# Patient Record
Sex: MALE | Race: WHITE | HISPANIC OR LATINO | Employment: FULL TIME | URBAN - METROPOLITAN AREA
[De-identification: names, ages, dates, MRNs, and addresses within clinical notes are randomized per-mention and may not be internally consistent; named-entity substitution may affect disease eponyms.]

---

## 2018-11-11 ENCOUNTER — APPOINTMENT (OUTPATIENT)
Dept: RADIOLOGY | Facility: MEDICAL CENTER | Age: 33
DRG: 638 | End: 2018-11-11
Attending: EMERGENCY MEDICINE
Payer: COMMERCIAL

## 2018-11-11 ENCOUNTER — HOSPITAL ENCOUNTER (INPATIENT)
Facility: MEDICAL CENTER | Age: 33
LOS: 1 days | DRG: 638 | End: 2018-11-12
Attending: EMERGENCY MEDICINE | Admitting: INTERNAL MEDICINE
Payer: COMMERCIAL

## 2018-11-11 DIAGNOSIS — R73.9 HYPERGLYCEMIA: ICD-10-CM

## 2018-11-11 DIAGNOSIS — M54.9 PAIN, UPPER BACK: ICD-10-CM

## 2018-11-11 DIAGNOSIS — K62.89 RECTAL PAIN: ICD-10-CM

## 2018-11-11 PROBLEM — E11.65 DIABETES MELLITUS WITH HYPERGLYCEMIA (HCC): Status: ACTIVE | Noted: 2018-11-11

## 2018-11-11 PROBLEM — E86.0 DEHYDRATION: Status: ACTIVE | Noted: 2018-11-11

## 2018-11-11 PROBLEM — K64.9 HEMORRHOID: Status: ACTIVE | Noted: 2018-11-11

## 2018-11-11 PROBLEM — F17.200 SMOKING: Status: ACTIVE | Noted: 2018-11-11

## 2018-11-11 PROBLEM — M54.50 LOW BACK PAIN: Status: ACTIVE | Noted: 2018-11-11

## 2018-11-11 PROBLEM — E87.1 HYPONATREMIA: Status: ACTIVE | Noted: 2018-11-11

## 2018-11-11 PROBLEM — E66.9 OBESITY: Status: ACTIVE | Noted: 2018-11-11

## 2018-11-11 LAB
ACETONE UR QL: NEGATIVE
ANION GAP SERPL CALC-SCNC: 8 MMOL/L (ref 0–11.9)
APPEARANCE UR: CLEAR
BASOPHILS # BLD AUTO: 0.6 % (ref 0–1.8)
BASOPHILS # BLD: 0.04 K/UL (ref 0–0.12)
BILIRUB UR QL STRIP.AUTO: NEGATIVE
BUN SERPL-MCNC: 7 MG/DL (ref 8–22)
CALCIUM SERPL-MCNC: 9.3 MG/DL (ref 8.5–10.5)
CHLORIDE SERPL-SCNC: 96 MMOL/L (ref 96–112)
CO2 SERPL-SCNC: 26 MMOL/L (ref 20–33)
COLOR UR: YELLOW
CREAT SERPL-MCNC: 0.81 MG/DL (ref 0.5–1.4)
EOSINOPHIL # BLD AUTO: 0.09 K/UL (ref 0–0.51)
EOSINOPHIL NFR BLD: 1.2 % (ref 0–6.9)
ERYTHROCYTE [DISTWIDTH] IN BLOOD BY AUTOMATED COUNT: 41.5 FL (ref 35.9–50)
EST. AVERAGE GLUCOSE BLD GHB EST-MCNC: 398 MG/DL
GLUCOSE BLD-MCNC: 256 MG/DL (ref 65–99)
GLUCOSE BLD-MCNC: 285 MG/DL (ref 65–99)
GLUCOSE BLD-MCNC: 319 MG/DL (ref 65–99)
GLUCOSE BLD-MCNC: 329 MG/DL (ref 65–99)
GLUCOSE SERPL-MCNC: 653 MG/DL (ref 65–99)
GLUCOSE UR STRIP.AUTO-MCNC: >=1000 MG/DL
HBA1C MFR BLD: 15.5 % (ref 0–5.6)
HCT VFR BLD AUTO: 41.5 % (ref 42–52)
HGB BLD-MCNC: 14.3 G/DL (ref 14–18)
IMM GRANULOCYTES # BLD AUTO: 0.03 K/UL (ref 0–0.11)
IMM GRANULOCYTES NFR BLD AUTO: 0.4 % (ref 0–0.9)
KETONES UR STRIP.AUTO-MCNC: NEGATIVE MG/DL
LEUKOCYTE ESTERASE UR QL STRIP.AUTO: NEGATIVE
LYMPHOCYTES # BLD AUTO: 0.87 K/UL (ref 1–4.8)
LYMPHOCYTES NFR BLD: 12 % (ref 22–41)
MAGNESIUM SERPL-MCNC: 1.9 MG/DL (ref 1.5–2.5)
MCH RBC QN AUTO: 30.8 PG (ref 27–33)
MCHC RBC AUTO-ENTMCNC: 34.5 G/DL (ref 33.7–35.3)
MCV RBC AUTO: 89.2 FL (ref 81.4–97.8)
MICRO URNS: ABNORMAL
MONOCYTES # BLD AUTO: 0.34 K/UL (ref 0–0.85)
MONOCYTES NFR BLD AUTO: 4.7 % (ref 0–13.4)
NEUTROPHILS # BLD AUTO: 5.89 K/UL (ref 1.82–7.42)
NEUTROPHILS NFR BLD: 81.1 % (ref 44–72)
NITRITE UR QL STRIP.AUTO: NEGATIVE
NRBC # BLD AUTO: 0 K/UL
NRBC BLD-RTO: 0 /100 WBC
PH UR STRIP.AUTO: 6 [PH]
PLATELET # BLD AUTO: 256 K/UL (ref 164–446)
PMV BLD AUTO: 10.6 FL (ref 9–12.9)
POTASSIUM SERPL-SCNC: 4.4 MMOL/L (ref 3.6–5.5)
PROT UR QL STRIP: NEGATIVE MG/DL
RBC # BLD AUTO: 4.65 M/UL (ref 4.7–6.1)
RBC UR QL AUTO: NEGATIVE
SODIUM SERPL-SCNC: 130 MMOL/L (ref 135–145)
SP GR UR STRIP.AUTO: 1.03
TSH SERPL DL<=0.005 MIU/L-ACNC: 3.33 UIU/ML (ref 0.38–5.33)
UROBILINOGEN UR STRIP.AUTO-MCNC: 0.2 MG/DL
WBC # BLD AUTO: 7.3 K/UL (ref 4.8–10.8)

## 2018-11-11 PROCEDURE — 84443 ASSAY THYROID STIM HORMONE: CPT

## 2018-11-11 PROCEDURE — 96374 THER/PROPH/DIAG INJ IV PUSH: CPT

## 2018-11-11 PROCEDURE — 83036 HEMOGLOBIN GLYCOSYLATED A1C: CPT

## 2018-11-11 PROCEDURE — 82962 GLUCOSE BLOOD TEST: CPT

## 2018-11-11 PROCEDURE — 99285 EMERGENCY DEPT VISIT HI MDM: CPT

## 2018-11-11 PROCEDURE — 72193 CT PELVIS W/DYE: CPT

## 2018-11-11 PROCEDURE — 96375 TX/PRO/DX INJ NEW DRUG ADDON: CPT

## 2018-11-11 PROCEDURE — 700111 HCHG RX REV CODE 636 W/ 250 OVERRIDE (IP): Performed by: EMERGENCY MEDICINE

## 2018-11-11 PROCEDURE — 700117 HCHG RX CONTRAST REV CODE 255: Performed by: EMERGENCY MEDICINE

## 2018-11-11 PROCEDURE — 83735 ASSAY OF MAGNESIUM: CPT

## 2018-11-11 PROCEDURE — 700102 HCHG RX REV CODE 250 W/ 637 OVERRIDE(OP): Performed by: EMERGENCY MEDICINE

## 2018-11-11 PROCEDURE — 770006 HCHG ROOM/CARE - MED/SURG/GYN SEMI*

## 2018-11-11 PROCEDURE — 80048 BASIC METABOLIC PNL TOTAL CA: CPT

## 2018-11-11 PROCEDURE — 99223 1ST HOSP IP/OBS HIGH 75: CPT | Performed by: INTERNAL MEDICINE

## 2018-11-11 PROCEDURE — 81003 URINALYSIS AUTO W/O SCOPE: CPT

## 2018-11-11 PROCEDURE — 81002 URINALYSIS NONAUTO W/O SCOPE: CPT

## 2018-11-11 PROCEDURE — 96376 TX/PRO/DX INJ SAME DRUG ADON: CPT

## 2018-11-11 PROCEDURE — 85025 COMPLETE CBC W/AUTO DIFF WBC: CPT

## 2018-11-11 PROCEDURE — 700105 HCHG RX REV CODE 258: Performed by: INTERNAL MEDICINE

## 2018-11-11 PROCEDURE — 700105 HCHG RX REV CODE 258: Performed by: EMERGENCY MEDICINE

## 2018-11-11 PROCEDURE — A9270 NON-COVERED ITEM OR SERVICE: HCPCS | Performed by: INTERNAL MEDICINE

## 2018-11-11 PROCEDURE — 72131 CT LUMBAR SPINE W/O DYE: CPT

## 2018-11-11 PROCEDURE — 700102 HCHG RX REV CODE 250 W/ 637 OVERRIDE(OP): Performed by: INTERNAL MEDICINE

## 2018-11-11 RX ORDER — POLYETHYLENE GLYCOL 3350 17 G/17G
1 POWDER, FOR SOLUTION ORAL
Status: DISCONTINUED | OUTPATIENT
Start: 2018-11-11 | End: 2018-11-12 | Stop reason: HOSPADM

## 2018-11-11 RX ORDER — BISACODYL 10 MG
10 SUPPOSITORY, RECTAL RECTAL
Status: DISCONTINUED | OUTPATIENT
Start: 2018-11-11 | End: 2018-11-12 | Stop reason: HOSPADM

## 2018-11-11 RX ORDER — ONDANSETRON 2 MG/ML
4 INJECTION INTRAMUSCULAR; INTRAVENOUS ONCE
Status: COMPLETED | OUTPATIENT
Start: 2018-11-11 | End: 2018-11-11

## 2018-11-11 RX ORDER — BENZOCAINE/MENTHOL 6 MG-10 MG
LOZENGE MUCOUS MEMBRANE 2 TIMES DAILY
Status: DISCONTINUED | OUTPATIENT
Start: 2018-11-11 | End: 2018-11-12 | Stop reason: HOSPADM

## 2018-11-11 RX ORDER — MORPHINE SULFATE 4 MG/ML
4 INJECTION, SOLUTION INTRAMUSCULAR; INTRAVENOUS ONCE
Status: COMPLETED | OUTPATIENT
Start: 2018-11-11 | End: 2018-11-11

## 2018-11-11 RX ORDER — SODIUM CHLORIDE 9 MG/ML
1000 INJECTION, SOLUTION INTRAVENOUS ONCE
Status: COMPLETED | OUTPATIENT
Start: 2018-11-11 | End: 2018-11-11

## 2018-11-11 RX ORDER — AMOXICILLIN 250 MG
2 CAPSULE ORAL 2 TIMES DAILY
Status: DISCONTINUED | OUTPATIENT
Start: 2018-11-11 | End: 2018-11-12 | Stop reason: HOSPADM

## 2018-11-11 RX ORDER — HEPARIN SODIUM 5000 [USP'U]/ML
5000 INJECTION, SOLUTION INTRAVENOUS; SUBCUTANEOUS EVERY 8 HOURS
Status: DISCONTINUED | OUTPATIENT
Start: 2018-11-11 | End: 2018-11-12 | Stop reason: HOSPADM

## 2018-11-11 RX ORDER — DEXTROSE MONOHYDRATE 25 G/50ML
25 INJECTION, SOLUTION INTRAVENOUS
Status: DISCONTINUED | OUTPATIENT
Start: 2018-11-11 | End: 2018-11-12 | Stop reason: HOSPADM

## 2018-11-11 RX ORDER — ACETAMINOPHEN 325 MG/1
650 TABLET ORAL EVERY 6 HOURS PRN
Status: DISCONTINUED | OUTPATIENT
Start: 2018-11-11 | End: 2018-11-12 | Stop reason: HOSPADM

## 2018-11-11 RX ORDER — SODIUM CHLORIDE 9 MG/ML
INJECTION, SOLUTION INTRAVENOUS CONTINUOUS
Status: DISCONTINUED | OUTPATIENT
Start: 2018-11-11 | End: 2018-11-12 | Stop reason: HOSPADM

## 2018-11-11 RX ADMIN — IOHEXOL 100 ML: 350 INJECTION, SOLUTION INTRAVENOUS at 11:26

## 2018-11-11 RX ADMIN — SODIUM CHLORIDE: 9 INJECTION, SOLUTION INTRAVENOUS at 15:16

## 2018-11-11 RX ADMIN — HYDROCORTISONE: 1 CREAM TOPICAL at 17:10

## 2018-11-11 RX ADMIN — ACETAMINOPHEN 650 MG: 325 TABLET, FILM COATED ORAL at 17:09

## 2018-11-11 RX ADMIN — SODIUM CHLORIDE 1000 ML: 9 INJECTION, SOLUTION INTRAVENOUS at 13:22

## 2018-11-11 RX ADMIN — INSULIN HUMAN 4 UNITS: 100 INJECTION, SOLUTION PARENTERAL at 21:02

## 2018-11-11 RX ADMIN — ONDANSETRON 4 MG: 2 INJECTION INTRAMUSCULAR; INTRAVENOUS at 10:02

## 2018-11-11 RX ADMIN — MORPHINE SULFATE 4 MG: 4 INJECTION INTRAVENOUS at 10:02

## 2018-11-11 RX ADMIN — MORPHINE SULFATE 4 MG: 4 INJECTION INTRAVENOUS at 13:17

## 2018-11-11 RX ADMIN — INSULIN HUMAN 3 UNITS: 100 INJECTION, SOLUTION PARENTERAL at 17:15

## 2018-11-11 RX ADMIN — SODIUM CHLORIDE 1000 ML: 9 INJECTION, SOLUTION INTRAVENOUS at 12:05

## 2018-11-11 ASSESSMENT — COGNITIVE AND FUNCTIONAL STATUS - GENERAL
DAILY ACTIVITIY SCORE: 24
SUGGESTED CMS G CODE MODIFIER DAILY ACTIVITY: CH
SUGGESTED CMS G CODE MODIFIER MOBILITY: CH
MOBILITY SCORE: 24

## 2018-11-11 ASSESSMENT — PATIENT HEALTH QUESTIONNAIRE - PHQ9
1. LITTLE INTEREST OR PLEASURE IN DOING THINGS: NOT AT ALL
2. FEELING DOWN, DEPRESSED, IRRITABLE, OR HOPELESS: NOT AT ALL
SUM OF ALL RESPONSES TO PHQ9 QUESTIONS 1 AND 2: 0

## 2018-11-11 ASSESSMENT — ENCOUNTER SYMPTOMS
BLURRED VISION: 0
NECK PAIN: 0
NAUSEA: 0
TINGLING: 0
NERVOUS/ANXIOUS: 1
DIZZINESS: 0
SPUTUM PRODUCTION: 0
PHOTOPHOBIA: 0
COUGH: 0
CHILLS: 0
VOMITING: 0
HEARTBURN: 0
BACK PAIN: 1
POLYDIPSIA: 1
MYALGIAS: 0
ORTHOPNEA: 0
PALPITATIONS: 0
HEMOPTYSIS: 0
DOUBLE VISION: 0
WEIGHT LOSS: 1
HEADACHES: 0
FEVER: 0

## 2018-11-11 ASSESSMENT — PAIN SCALES - GENERAL
PAINLEVEL_OUTOF10: 10
PAINLEVEL_OUTOF10: 8
PAINLEVEL_OUTOF10: 0
PAINLEVEL_OUTOF10: 3
PAINLEVEL_OUTOF10: 10
PAINLEVEL_OUTOF10: 0

## 2018-11-11 ASSESSMENT — LIFESTYLE VARIABLES
DO YOU DRINK ALCOHOL: NO
SUBSTANCE_ABUSE: 0

## 2018-11-11 NOTE — ED TRIAGE NOTES
Ambulatory to triage with   Chief Complaint   Patient presents with   • Back Pain   • Leg Pain   • Rectal Pain   States seen at [facility] and diagnosed with hemorrhoid 2 weeks ago. States symptoms worsening despite medicated ointment. In obvious discomfort. Denies fever/chills.

## 2018-11-11 NOTE — ASSESSMENT & PLAN NOTE
Spent approx 5 mins on Tobacco cessation education . Discussed options of nicotine patch, medical treatment with wellbutrin and chantix. Discussed the benefits of quitting smoking and risks of continued smoking including cardiovascular disease, cancer and COPD.   Code 22907

## 2018-11-11 NOTE — CARE PLAN
Problem: Communication  Goal: The ability to communicate needs accurately and effectively will improve  Outcome: PROGRESSING AS EXPECTED  Pt verbalizes understanding to POC. Awaiting diabetic educator consult to go over new diagnosis of DM. Pt tearful asking if he is going to die. ED MD informed pt that if he left the ED without speaking with educator, he would die. This RN educated pt on long term complications of diabetes leading to eventual death. RN encouraged pt to speak with educator and learn about his disease. Educated pt that DM is treatable and managed with medications, diet and exercise. Pt verbalizes understanding.     Problem: Safety  Goal: Will remain free from injury  Outcome: PROGRESSING AS EXPECTED  Pt is a no risk for falls. Educated to call for assistance if needed.

## 2018-11-11 NOTE — ASSESSMENT & PLAN NOTE
This is pseudohyponatremia related to hyperglycemia  Continue with insulin treatment, monitor sodium

## 2018-11-11 NOTE — H&P
Hospital Medicine History & Physical Note    Date of Service  11/11/2018    Primary Care Physician  Pcp Pt States None    Consultants  None    Code Status  Full code    Chief Complaint  Back pain, thirst    History of Presenting Illness  33 y.o. male with history of obesity who presented 11/11/2018 with complaints of back pain, radiating to his bilateral legs with some bilateral leg numbness.  Up to 8 out of 10, worsening with movement.  He denies urine incontinence or fecal incontinence.  He does complain of some rectal pain that is not new.  It is associated with painful defecation and small amount of blood on toilet paper.  He was evaluated for this recently at [facility] and diagnosed with hemorrhoids and was treated with cream and stool softeners.  Review of system positive for source, poorly urea, weight loss 30 pounds unintentional in the last few months.  Blood work characteristic for a markedly high blood sugar at 600.  Therefore, patient was diagnosed with new onset diabetes today.    Review of Systems  Review of Systems   Constitutional: Positive for malaise/fatigue and weight loss. Negative for chills and fever.   HENT: Negative for ear pain, hearing loss and tinnitus.    Eyes: Negative for blurred vision, double vision and photophobia.   Respiratory: Negative for cough, hemoptysis and sputum production.    Cardiovascular: Negative for chest pain, palpitations and orthopnea.   Gastrointestinal: Negative for heartburn, nausea and vomiting.   Genitourinary: Positive for frequency. Negative for dysuria and urgency.   Musculoskeletal: Positive for back pain. Negative for myalgias and neck pain.   Skin: Negative for itching and rash.   Neurological: Negative for dizziness, tingling and headaches.   Endo/Heme/Allergies: Positive for polydipsia.   Psychiatric/Behavioral: Negative for substance abuse and suicidal ideas. The patient is nervous/anxious.        Past Medical History  Obesity    Surgical History    has no past surgical history on file.     Family History  History of diabetes in mother    Social History   reports that he has been smoking Cigarettes.  He has never used smokeless tobacco. He reports that he drinks alcohol. He reports that he does not use drugs.    Allergies  No Known Allergies    Medications  None       Physical Exam  Temp:  [36.1 °C (96.9 °F)] 36.1 °C (96.9 °F)  Pulse:  [72-88] 79  Resp:  [18-20] 18  BP: (120-133)/(71-74) 120/71    Physical Exam   Constitutional: He is oriented to person, place, and time. He appears well-developed and well-nourished.   HENT:   Dry mucous membranes   Neck: Normal range of motion. Neck supple.   Cardiovascular: Normal rate and regular rhythm.    Pulmonary/Chest: Effort normal and breath sounds normal.   Abdominal: Soft. Bowel sounds are normal.   Musculoskeletal: Normal range of motion.   Tenderness to palpation in the right paraspinal lumbar area     Neurological: He is alert and oriented to person, place, and time.   No focal deficit   Skin: Skin is warm and dry.   Nursing note and vitals reviewed.      Laboratory:  Recent Labs      11/11/18   0945   WBC  7.3   RBC  4.65*   HEMOGLOBIN  14.3   HEMATOCRIT  41.5*   MCV  89.2   MCH  30.8   MCHC  34.5   RDW  41.5   PLATELETCT  256   MPV  10.6     Recent Labs      11/11/18   0945   SODIUM  130*   POTASSIUM  4.4   CHLORIDE  96   CO2  26   GLUCOSE  653*   BUN  7*   CREATININE  0.81   CALCIUM  9.3     Recent Labs      11/11/18   0945   GLUCOSE  653*                 No results for input(s): TROPONINI in the last 72 hours.    Urinalysis:    Recent Labs      11/11/18   0945   SPECGRAVITY  1.033   GLUCOSEUR  >=1000*   KETONES  Negative  Negative   NITRITE  Negative   LEUKESTERAS  Negative        Imaging:  CT-PELVIS WITH   Final Result      No evidence of pelvic or perirectal inflammation or fluid collection.      CT-LSPINE W/O PLUS RECONS   Final Result      1.  No evidence of fracture of the lumbar spine.      2.   Degenerative disc disease at L1-2.            Assessment/Plan:  I anticipate this patient will require at least two midnights for appropriate medical management, necessitating inpatient admission.    Diabetes mellitus with hyperglycemia (HCC)   Assessment & Plan    Newly diagnosed.  With hyperglycemia, without ketoacidosis  Given 10 units of insulin and will be started on sliding scale  A1c pending  Patient will need diabetes education     Smoking   Assessment & Plan    Spent approx 5 mins on Tobacco cessation education . Discussed options of nicotine patch, medical treatment with wellbutrin and chantix. Discussed the benefits of quitting smoking and risks of continued smoking including cardiovascular disease, cancer and COPD.   Code 08255       Dehydration   Assessment & Plan    Secondary to uncontrolled diabetes  We will start IV fluids     Class 2 obesity in adult   Assessment & Plan    Recommended outpatient management of obesity.  Regular exercises     Hyponatremia   Assessment & Plan    This is pseudohyponatremia related to hyperglycemia  Continue with insulin treatment, monitor sodium     Hemorrhoid   Assessment & Plan    CT of the pelvis did not show evidence of perirectal abscess  Anti-hemorrhoid cream     Low back pain   Assessment & Plan    With associated sciatica  CT of the lumbar spine showed no evidence of fracture of the lumbar spine.  Degenerative changes in L1-L2  Pain control, physical therapy         VTE prophylaxis: Heparin

## 2018-11-11 NOTE — ED PROVIDER NOTES
ED Provider Note    Scribed for Isaiah Mercado M.D. by Goldie Calix. 11/11/2018  8:44 AM    Primary Care Provider: Pcp Pt States None  Means of arrival: walk-in  History limited by: none    CHIEF COMPLAINT  Chief Complaint   Patient presents with   • Back Pain   • Leg Pain   • Rectal Pain       HPI  Moshe Soto is a 33 y.o. male who presents to the ED complaining of rectal pain that has been present for the last 3 weeks, as well as lower back pain with pain/numbness radiation into his bilateral legs that began earlier this morning.   Patient was evaluated at [facility] for the same rectal pain at initial onset, diagnosed with a hemorrhoid, discharged home with a cream to treat it. He states that the discomfort has improved with the cream, however, he has had to continuously apply it and it has never fully resolved. Since the evaluation at [facility], patient has been taking stool softeners. He states this is working, with regular soft bowel movement, however, notices approximately a teaspoon worth of bright red blood along the outside of his formed stools every time. No gross hematochezia or melena noted.   Today the patient became concerned when he woke up with newly onset lower back pain that radiates down the back of bilateral legs with associated bilateral leg numbness as well. Patient states that he has experienced this back discomfort in the past, however, never with the leg involvement or numbness. The back pain experienced today is exacerbated with any sort of movement. 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    Patient states that he has lost 30 lbs of intentional weight loss over the last few months.  No complaints of fevers, headache, vision changes, rhinorrhea, sore throat, chest pain, shortness of breath, abdominal pain, nausea, vomiting, diarrhea, rash, unilateral weakness/numbness.    REVIEW OF SYSTEMS    CONSTITUTIONAL:  Denies fever positive weight  "loss  EYES:  Denies vision changes  ENT:  Denies sore throat, rhinorrhea  CARDIOVASCULAR:  Denies chest pain  RESPIRATORY:  Denies shortness of breath  GI:  Positive soft stools with mild hematochezia, denies gross hematochezia or melena. Denies abdominal pain, nausea, vomiting, or diarrhea.  MUSCULOSKELETAL:  Lower back pain with radiation down bilateral legs  SKIN:  No rash  NEUROLOGIC: positive numbness down the back of bilateral legs Denies headache, focal weakness, or numbness.    PAST MEDICAL HISTORY  History of hemorrhoids    SOCIAL HISTORY   reports that he has been smoking Cigarettes.  He has never used smokeless tobacco. He reports that he drinks alcohol. He reports that he does not use drugs.    SURGICAL HISTORY  No back surgery history    CURRENT MEDICATIONS  Home Medications     Reviewed by Shanique Lopez R.N. (Registered Nurse) on 11/11/18 at 0818  Med List Status: <None>   Medication Last Dose Status        Patient Himanshu Taking any Medications                       ALLERGIES  No Known Allergies    PHYSICAL EXAM  VITAL SIGNS: /74   Pulse 88   Temp 36.1 °C (96.9 °F)   Resp 20   Ht 1.676 m (5' 6\")   Wt 111.1 kg (245 lb)   SpO2 98%   BMI 39.54 kg/m²      Constitutional: Patient is awake and alert. No acute respiratory distress. Well developed, Well nourished, Non-toxic appearance.  HENT: Normocephalic, Atraumatic, Bilateral external ears normal, Oropharynx pink moist with no exudates, Nose patent.  Eyes: PERRLA, EOMI, Sclera and conjunctiva clear, No discharge.   Neck:  Supple no nuchal rigidity, no thyromegaly or mass. Non-tender  Lymphatic: No supraclavicular  lymph nodes.   Cardiovascular: Heart is regular rate and rhythm no murmur, rub or thrill.   Thorax & Lungs: Chest is symmetrical, with good breath sounds. No wheezing or crackles. No respiratory distress, No chest tenderness.   Abdomen: Soft, No tenderness no hepatosplenomegaly there is no guarding or rebound, referred pain to the " back. No masses, No pulsatile masses.  GI: anal cleft, superior dimple, no obvious external hemorrhoids, refused rectal exam, stool appears light brown.  No real pain with palpation around the anal sphincter itself or the buttocks cheeks.  Skin: Warm, Dry, no petechia, purpura, or rash.   Back: lower back tenderness with palpation, no gross deformities or redness, No CVA tenderness.   Extremities: No edema. Non tender.   Musculoskeletal: Good range of motion to wrists, elbows, shoulders, hips, knees, and ankles. Pulses 2+ radially and femorally. No gross deformities noted.   Endocrine: Positive polyuria and polydipsia  Neurologic: Alert & oriented to person, time, and place.  Strength is 5 over 5 and symmetric in bilateral upper and lower extremities.  Sensory is intact to light touch to face, arms, and legs.  DTRs are symmetrical in biceps brachioradialis, patella and Achilles.     LABS  Results for orders placed or performed during the hospital encounter of 11/11/18   CBC WITH DIFFERENTIAL   Result Value Ref Range    WBC 7.3 4.8 - 10.8 K/uL    RBC 4.65 (L) 4.70 - 6.10 M/uL    Hemoglobin 14.3 14.0 - 18.0 g/dL    Hematocrit 41.5 (L) 42.0 - 52.0 %    MCV 89.2 81.4 - 97.8 fL    MCH 30.8 27.0 - 33.0 pg    MCHC 34.5 33.7 - 35.3 g/dL    RDW 41.5 35.9 - 50.0 fL    Platelet Count 256 164 - 446 K/uL    MPV 10.6 9.0 - 12.9 fL    Neutrophils-Polys 81.10 (H) 44.00 - 72.00 %    Lymphocytes 12.00 (L) 22.00 - 41.00 %    Monocytes 4.70 0.00 - 13.40 %    Eosinophils 1.20 0.00 - 6.90 %    Basophils 0.60 0.00 - 1.80 %    Immature Granulocytes 0.40 0.00 - 0.90 %    Nucleated RBC 0.00 /100 WBC    Neutrophils (Absolute) 5.89 1.82 - 7.42 K/uL    Lymphs (Absolute) 0.87 (L) 1.00 - 4.80 K/uL    Monos (Absolute) 0.34 0.00 - 0.85 K/uL    Eos (Absolute) 0.09 0.00 - 0.51 K/uL    Baso (Absolute) 0.04 0.00 - 0.12 K/uL    Immature Granulocytes (abs) 0.03 0.00 - 0.11 K/uL    NRBC (Absolute) 0.00 K/uL   BASIC METABOLIC PANEL   Result Value Ref Range     Sodium 130 (L) 135 - 145 mmol/L    Potassium 4.4 3.6 - 5.5 mmol/L    Chloride 96 96 - 112 mmol/L    Co2 26 20 - 33 mmol/L    Glucose 653 (HH) 65 - 99 mg/dL    Bun 7 (L) 8 - 22 mg/dL    Creatinine 0.81 0.50 - 1.40 mg/dL    Calcium 9.3 8.5 - 10.5 mg/dL    Anion Gap 8.0 0.0 - 11.9   URINALYSIS   Result Value Ref Range    Color Yellow     Character Clear     Specific Gravity 1.033 <1.035    Ph 6.0 5.0 - 8.0    Glucose >=1000 (A) Negative mg/dL    Ketones Negative Negative mg/dL    Protein Negative Negative mg/dL    Bilirubin Negative Negative    Urobilinogen, Urine 0.2 Negative    Nitrite Negative Negative    Leukocyte Esterase Negative Negative    Occult Blood Negative Negative    Micro Urine Req see below    ESTIMATED GFR   Result Value Ref Range    GFR If African American >60 >60 mL/min/1.73 m 2    GFR If Non African American >60 >60 mL/min/1.73 m 2       All labs reviewed by me.    RADIOLOGY/PROCEDURES  CT-PELVIS WITH   Final Result      No evidence of pelvic or perirectal inflammation or fluid collection.      CT-LSPINE W/O PLUS RECONS   Final Result      1.  No evidence of fracture of the lumbar spine.      2.  Degenerative disc disease at L1-2.        The radiologist's interpretations of all radiological studies have been reviewed by me.     COURSE & MEDICAL DECISION MAKING  Pertinent Labs & Imaging studies reviewed. (See chart for details)    Differential diagnoses include but are not limited to back pain, musculoskeletal, disk, epidural abscess vs hematoma, cauda equina, perirectal abscess, hemorrhoid musculoskeletal pain.    8:44 AM - Patient seen and examined at bedside. He presents with normal vitals but in mild uncomfortable distress primarily for evaluation of lower back pain onset this morning with associated pain and numbness radiation down the back of bilateral legs. Patient has been treating rectal pain believed to be secondary to a hemorrhoid over the last 3 weeks as well reporting associated mild  hematochezia with each bowel movement that has continued into today. Ordered L spine CT, pelvis CT, UA, CBC, BMP.  I informed the patient that I would evaluate for acute origins of his discomfort with lab work and imaging. He understands and agrees with treatment plan.    10:09 AM Patient is requesting pain medication at this time. He has a ride home, 4 mg IV Morphine ordered.    11:14 AM I re-evaluated the patient at bedside and informed him of his concerning lab values that indicates new onset diabetes. Patient states that he has been consuming more water consistently over the last few months, no because of thirst but in an effort to lose weight. There is family history of diabetes in his mother. I explained we would begin treatment of this, and are still waiting for CT scan results. Denies any steroid use at this time.    11:55 AM Spoke with Dr. Zepeda, Hospitalist, about the patient's condition. Agrees to admit the patient. IV fluids and Insulin will be started secondary to hyperglycemia. Patient was informed of this, however, would like to be discharged home AMA at this time. A long discussion was had with him regarding adverse consequences, should he not receive the appropriate treatment. Patient understands, and for the time being, agrees to stay.    HYDRATION: Based on the patient's presentation of Hyperglycemia the patient was given IV fluids. IV Hydration was used because oral hydration was not adequate alone. Upon recheck following hydration, the patient was improved.    Decision Making  Patient presents emergency department with weight loss of 30 pounds polyuria and polydipsia for 2 months.  Here in the emergency room was found the patient has a glucose of over 600.  I feel that the patient needs to be admitted to the hospital.  I given him a liter of fluid and his sugars have come down to the upper 300s.  I discussed the case with the [facility]ist and they will admitted to the hospital..   Initially was getting given him insulin however because of the rapid drop in his sugars just with fluid alone I was a little hesitant to give him the initial insulin bolus when he was around 300.  A long discussion with the patient initially wanted to leave however I explained to him he does not have a doctor for follow-up and that he needs to get this figured out and his sugars controlled right away otherwise he could have severe complications or severe illness.  He is willing to stay.  I believe because of his pain in his rectal area his back and his hyperglycemia is not stable for transfer at this time.  Note a CT scan of his pelvis was done to rule out any kind of perirectal or rectal abscess this is not seen on CAT scans.        DISPOSITION:  Patient will be admitted to Dr. Zepeda, Hospitalist in stable condition.    FINAL IMPRESSION  New onset diabetes mellitus  Back pain and rectal pain etiology unclear    PLAN  1.  Admission [facility]  2.  Continue workup and treatment of his new onset diabetes  3.  Continue workup and evaluation of his back pain and rectal pain        IGoldie (Leona), am scribing for, and in the presence of, Isaiah Mercado M.D..    Electronically signed by: Goldie Calix (Leona), 11/11/2018    IIsaiah M.D. personally performed the services described in this documentation, as scribed by Goldie Calix in my presence, and it is both accurate and complete.    The note accurately reflects work and decisions made by me.  Isaiah Mercado  11/11/2018  3:05 PM

## 2018-11-11 NOTE — ASSESSMENT & PLAN NOTE
With associated sciatica  CT of the lumbar spine showed no evidence of fracture of the lumbar spine.  Degenerative changes in L1-L2  Pain control, physical therapy

## 2018-11-11 NOTE — PROGRESS NOTES
1445 Pt arrived to the floor via gurney. Ambulated to bed without problems. Pt states his back pain has mostly resolved. Assessment per flowsheets. Lunch tray ordered. Will recheck accucheck.     2 RN skin check completed. All bony prominences checked. No areas of skin breakdown noted.

## 2018-11-11 NOTE — ASSESSMENT & PLAN NOTE
Newly diagnosed.  With hyperglycemia, without ketoacidosis  Given 10 units of insulin and will be started on sliding scale  A1c pending  Patient will need diabetes education

## 2018-11-11 NOTE — ED NOTES
It is noted pt has [employer].  Called Dr. Mercado to see if pt is stable for transfer, he states not at this time.

## 2018-11-12 ENCOUNTER — PATIENT OUTREACH (OUTPATIENT)
Dept: HEALTH INFORMATION MANAGEMENT | Facility: OTHER | Age: 33
End: 2018-11-12

## 2018-11-12 VITALS
WEIGHT: 245 LBS | RESPIRATION RATE: 18 BRPM | HEART RATE: 84 BPM | OXYGEN SATURATION: 96 % | TEMPERATURE: 96.7 F | HEIGHT: 66 IN | SYSTOLIC BLOOD PRESSURE: 116 MMHG | DIASTOLIC BLOOD PRESSURE: 70 MMHG | BODY MASS INDEX: 39.37 KG/M2

## 2018-11-12 PROBLEM — E86.0 DEHYDRATION: Status: RESOLVED | Noted: 2018-11-11 | Resolved: 2018-11-12

## 2018-11-12 LAB
ANION GAP SERPL CALC-SCNC: 5 MMOL/L (ref 0–11.9)
BASOPHILS # BLD AUTO: 0.6 % (ref 0–1.8)
BASOPHILS # BLD: 0.05 K/UL (ref 0–0.12)
BUN SERPL-MCNC: 5 MG/DL (ref 8–22)
CALCIUM SERPL-MCNC: 8.6 MG/DL (ref 8.5–10.5)
CHLORIDE SERPL-SCNC: 104 MMOL/L (ref 96–112)
CHOLEST SERPL-MCNC: 108 MG/DL (ref 100–199)
CO2 SERPL-SCNC: 24 MMOL/L (ref 20–33)
CREAT SERPL-MCNC: 0.6 MG/DL (ref 0.5–1.4)
EOSINOPHIL # BLD AUTO: 0.3 K/UL (ref 0–0.51)
EOSINOPHIL NFR BLD: 3.9 % (ref 0–6.9)
ERYTHROCYTE [DISTWIDTH] IN BLOOD BY AUTOMATED COUNT: 40.8 FL (ref 35.9–50)
GLUCOSE BLD-MCNC: 252 MG/DL (ref 65–99)
GLUCOSE BLD-MCNC: 288 MG/DL (ref 65–99)
GLUCOSE SERPL-MCNC: 275 MG/DL (ref 65–99)
HCT VFR BLD AUTO: 35.8 % (ref 42–52)
HDLC SERPL-MCNC: 27 MG/DL
HGB BLD-MCNC: 12 G/DL (ref 14–18)
IMM GRANULOCYTES # BLD AUTO: 0.03 K/UL (ref 0–0.11)
IMM GRANULOCYTES NFR BLD AUTO: 0.4 % (ref 0–0.9)
LDLC SERPL CALC-MCNC: 58 MG/DL
LYMPHOCYTES # BLD AUTO: 1.98 K/UL (ref 1–4.8)
LYMPHOCYTES NFR BLD: 25.5 % (ref 22–41)
MCH RBC QN AUTO: 29.9 PG (ref 27–33)
MCHC RBC AUTO-ENTMCNC: 33.5 G/DL (ref 33.7–35.3)
MCV RBC AUTO: 89.1 FL (ref 81.4–97.8)
MONOCYTES # BLD AUTO: 0.5 K/UL (ref 0–0.85)
MONOCYTES NFR BLD AUTO: 6.4 % (ref 0–13.4)
NEUTROPHILS # BLD AUTO: 4.9 K/UL (ref 1.82–7.42)
NEUTROPHILS NFR BLD: 63.2 % (ref 44–72)
NRBC # BLD AUTO: 0 K/UL
NRBC BLD-RTO: 0 /100 WBC
PLATELET # BLD AUTO: 233 K/UL (ref 164–446)
PMV BLD AUTO: 10.2 FL (ref 9–12.9)
POTASSIUM SERPL-SCNC: 3.3 MMOL/L (ref 3.6–5.5)
RBC # BLD AUTO: 4.02 M/UL (ref 4.7–6.1)
SODIUM SERPL-SCNC: 133 MMOL/L (ref 135–145)
TRIGL SERPL-MCNC: 113 MG/DL (ref 0–149)
WBC # BLD AUTO: 7.8 K/UL (ref 4.8–10.8)

## 2018-11-12 PROCEDURE — 700102 HCHG RX REV CODE 250 W/ 637 OVERRIDE(OP): Performed by: INTERNAL MEDICINE

## 2018-11-12 PROCEDURE — A9270 NON-COVERED ITEM OR SERVICE: HCPCS | Performed by: HOSPITALIST

## 2018-11-12 PROCEDURE — 80061 LIPID PANEL: CPT

## 2018-11-12 PROCEDURE — 700102 HCHG RX REV CODE 250 W/ 637 OVERRIDE(OP): Performed by: HOSPITALIST

## 2018-11-12 PROCEDURE — A9270 NON-COVERED ITEM OR SERVICE: HCPCS | Performed by: INTERNAL MEDICINE

## 2018-11-12 PROCEDURE — 85025 COMPLETE CBC W/AUTO DIFF WBC: CPT

## 2018-11-12 PROCEDURE — 80048 BASIC METABOLIC PNL TOTAL CA: CPT

## 2018-11-12 PROCEDURE — 99239 HOSP IP/OBS DSCHRG MGMT >30: CPT | Performed by: HOSPITALIST

## 2018-11-12 PROCEDURE — 36415 COLL VENOUS BLD VENIPUNCTURE: CPT

## 2018-11-12 PROCEDURE — 82962 GLUCOSE BLOOD TEST: CPT

## 2018-11-12 RX ORDER — GLYBURIDE 5 MG/1
5 TABLET ORAL 2 TIMES DAILY WITH MEALS
Qty: 60 TAB | Refills: 5 | Status: SHIPPED | OUTPATIENT
Start: 2018-11-12

## 2018-11-12 RX ORDER — GLYBURIDE 2.5 MG/1
2.5 TABLET ORAL 2 TIMES DAILY WITH MEALS
Status: DISCONTINUED | OUTPATIENT
Start: 2018-11-12 | End: 2018-11-12 | Stop reason: HOSPADM

## 2018-11-12 RX ORDER — LANCETS 30 GAUGE
EACH MISCELLANEOUS
Qty: 100 EACH | Refills: 0 | Status: SHIPPED | OUTPATIENT
Start: 2018-11-12

## 2018-11-12 RX ORDER — GLUCOSAMINE HCL/CHONDROITIN SU 500-400 MG
CAPSULE ORAL
Qty: 100 EACH | Refills: 0 | Status: SHIPPED | OUTPATIENT
Start: 2018-11-12

## 2018-11-12 RX ADMIN — STANDARDIZED SENNA CONCENTRATE AND DOCUSATE SODIUM 2 TABLET: 8.6; 5 TABLET, FILM COATED ORAL at 04:26

## 2018-11-12 RX ADMIN — ACETAMINOPHEN 650 MG: 325 TABLET, FILM COATED ORAL at 04:26

## 2018-11-12 RX ADMIN — INSULIN HUMAN 3 UNITS: 100 INJECTION, SOLUTION PARENTERAL at 12:44

## 2018-11-12 RX ADMIN — METFORMIN HYDROCHLORIDE 850 MG: 850 TABLET ORAL at 09:19

## 2018-11-12 RX ADMIN — INSULIN HUMAN 2 UNITS: 100 INJECTION, SOLUTION PARENTERAL at 08:26

## 2018-11-12 RX ADMIN — GLYBURIDE 2.5 MG: 2.5 TABLET ORAL at 09:18

## 2018-11-12 RX ADMIN — ACETAMINOPHEN 650 MG: 325 TABLET, FILM COATED ORAL at 14:16

## 2018-11-12 ASSESSMENT — PAIN SCALES - GENERAL: PAINLEVEL_OUTOF10: 0

## 2018-11-12 NOTE — PROGRESS NOTES
Assumed patient care 1900. Received bedside report. Patient alert and oriented x4 with somewhat flat affect. Patient reports having difficulty accepting new diagnoses and is nervous. Provided therapeutic communication and some diabetic education. Patient denies any current discomfort. Call light in reach, personal belongings at bedside. Denies any further questions or concerns at this time continue hourly rounding.

## 2018-11-12 NOTE — PROGRESS NOTES
Discharge AVS given to pt, signed and all questions answered. PIV removed, and multiple handouts put in pt's purple folder. Pt to call to follow up with PCP. No DME needed. Pt is going home with family (mother). Pt given tylenol and heat pack before d/c.

## 2018-11-12 NOTE — DISCHARGE PLANNING
Anticipated Discharge Disposition: Home without skilled needs.    Action: RN CM left voice mail for ER , [phone], for PCP purposes. Diabetic Educator states pt's insurance, [employer], does not cover what he needs.     Barriers to Discharge: Medical clearance.    Plan: RN CM to work with Diabetic Educator on discharge plan.

## 2018-11-12 NOTE — DISCHARGE INSTRUCTIONS
Discharge Instructions    Discharged to home by car with relative. Discharged via wheelchair, hospital escort: Yes.  Special equipment needed: Not Applicable    Be sure to schedule a follow-up appointment with your primary care doctor or any specialists as instructed.     Discharge Plan:   Diet Plan: Discussed  Activity Level: Discussed  Confirmed Follow up Appointment: Appointment Scheduled  Confirmed Symptoms Management: Discussed  Medication Reconciliation Updated: Yes  Influenza Vaccine Indication: Patient Refuses    I understand that a diet low in cholesterol, fat, and sodium is recommended for good health. Unless I have been given specific instructions below for another diet, I accept this instruction as my diet prescription.   Other diet: Diabetic    Special Instructions: None    · Is patient discharged on Warfarin / Coumadin?   No     Diabetes y planificación de los días por enfermedad  (Diabetes and Sick Day Management)  Los niveles de azúcar en la devan (glucosa) pueden ser más difíciles de controlar cuando se está enfermo. Los resfríos, la fiebre, la gripe, las náuseas, los vómitos y la diarrea son ejemplos de enfermedades comunes que pueden causar problemas para las personas con diabetes. La pérdida de líquidos corporales (deshidratación) por fiebre, vómitos, diarrea e infecciones, y el estrés de joelle enfermedad pueden hacer que los niveles de glucosa en la devan aumenten. Por zuleyka motivo, es muy importante que tome brian medicamentos para la diabetes y que coma algún alimento con carbohidratos cuando está enfermo. Los alimentos líquidos o blandos generalmente son yan tolerados, y ayudan a reponer los líquidos.  INSTRUCCIONES PARA EL CUIDADO EN EL HOGAR  Las pautas principales están destinadas al control de joelle enfermedad de corta (24 horas o menos) duración:  Aplíquese morris dosis habitual de insulina o los medicamentos por vía oral para la diabetes. Joelle excepción sería si usted pablo algún medicamento con  metformina. Si no puede comer o beber, puede deshidratarse y no debe jomar zuleyka medicamento.  Siga recibiendo la insulina incluso cuando no pueda comer alimentos sólidos o vomite. Las dosis de insulina pueden seguir siendo las mismas o puede ser necesario aumentarlas.   Debe controlar con más frecuencia los niveles de glucosa en la devan, generalmente cada 2 a 4 horas. Si tiene diabetes tipo 1, deberá hacerse análisis de cetonas en la orina cada 4 horas. Si sufre diabetes tipo 2, controle las cetonas en la orina según las indicaciones del médico.  Consuma alimentos que contengan carbohidratos, en cualquier forma. Los carbohidratos pueden estar en alimentos sólidos o líquidos. Debe consumir entre 45 y 50 gramos de carbohidratos cada 3 o 4 horas.  Reponga líquidos si tiene fiebre, vomita o tiene diarrea. Pida instrucciones específicas a morris médico con respecto a la rehidratación.  Si tiene diabetes tipo 1 observe estrictamente si tiene signos de cetoacidosis. Comuníquese con morris médico si se presenta alguno de estos síntomas, especialmente en los niños:  Elizabeth cifra moderada o elevada de cetonas en la orina, junto con un nivel elevado de glucosa en la devan.  Náuseas intensas.  Vómitos.  Diarrea.  Dolor abdominal.  Respiración rápida.  Marilyn más cantidad de líquidos que no contengan azúcar parth agua.  Tenga cuidado con los medicamentos de venta patience. Pretty las etiquetas. Pueden contener azúcar u otros tipos de azúcares que elevarán morris nivel de glucosa en la devan.  Diabetes Mellitus and Exercise  Exercising regularly is important for your overall health, especially when you have diabetes (diabetes mellitus). Exercising is not only about losing weight. It has many health benefits, such as increasing muscle strength and bone density and reducing body fat and stress. This leads to improved fitness, flexibility, and endurance, all of which result in better overall health.  Exercise has additional benefits for people with  diabetes, including:  Reducing appetite.  Helping to lower and control blood glucose.  Lowering blood pressure.  Helping to control amounts of fatty substances (lipids) in the blood, such as cholesterol and triglycerides.  Helping the body to respond better to insulin (improving insulin sensitivity).  Reducing how much insulin the body needs.  Decreasing the risk for heart disease by:  Lowering cholesterol and triglyceride levels.  Increasing the levels of good cholesterol.  Lowering blood glucose levels.  What is my activity plan?  Your health care provider or certified diabetes educator can help you make a plan for the type and frequency of exercise (activity plan) that works for you. Make sure that you:  Do at least 150 minutes of moderate-intensity or vigorous-intensity exercise each week. This could be brisk walking, biking, or water aerobics.  Do stretching and strength exercises, such as yoga or weightlifting, at least 2 times a week.  Spread out your activity over at least 3 days of the week.  Get some form of physical activity every day.  Do not go more than 2 days in a row without some kind of physical activity.  Avoid being inactive for more than 90 minutes at a time. Take frequent breaks to walk or stretch.  Choose a type of exercise or activity that you enjoy, and set realistic goals.  Start slowly, and gradually increase the intensity of your exercise over time.  What do I need to know about managing my diabetes?  Check your blood glucose before and after exercising.  If your blood glucose is higher than 240 mg/dL (13.3 mmol/L) before you exercise, check your urine for ketones. If you have ketones in your urine, do not exercise until your blood glucose returns to normal.  Know the symptoms of low blood glucose (hypoglycemia) and how to treat it. Your risk for hypoglycemia increases during and after exercise. Common symptoms of hypoglycemia can include:  Hunger.  Anxiety.  Sweating and feeling  clammy.  Confusion.  Dizziness or feeling light-headed.  Increased heart rate or palpitations.  Blurry vision.  Tingling or numbness around the mouth, lips, or tongue.  Tremors or shakes.  Irritability.  Keep a rapid-acting carbohydrate snack available before, during, and after exercise to help prevent or treat hypoglycemia.  Avoid injecting insulin into areas of the body that are going to be exercised. For example, avoid injecting insulin into:  The arms, when playing tennis.  The legs, when jogging.  Keep records of your exercise habits. Doing this can help you and your health care provider adjust your diabetes management plan as needed. Write down:  Food that you eat before and after you exercise.  Blood glucose levels before and after you exercise.  The type and amount of exercise you have done.  When your insulin is expected to peak, if you use insulin. Avoid exercising at times when your insulin is peaking.  When you start a new exercise or activity, work with your health care provider to make sure the activity is safe for you, and to adjust your insulin, medicines, or food intake as needed.  Drink plenty of water while you exercise to prevent dehydration or heat stroke. Drink enough fluid to keep your urine clear or pale yellow.  This information is not intended to replace advice given to you by your health care provider. Make sure you discuss any questions you have with your health care provider.  Document Released: 03/09/2005 Document Revised: 07/07/2017 Document Reviewed: 05/29/2017  [employer] Interactive Patient Education © 2017 [employer]  Diabetes Mellitus and Food  It is important for you to manage your blood sugar (glucose) level. Your blood glucose level can be greatly affected by what you eat. Eating healthier foods in the appropriate amounts throughout the day at about the same time each day will help you control your blood glucose level. It can also help slow or prevent worsening of your  diabetes mellitus. Healthy eating may even help you improve the level of your blood pressure and reach or maintain a healthy weight.  General recommendations for healthful eating and cooking habits include:  · Eating meals and snacks regularly. Avoid going long periods of time without eating to lose weight.  · Eating a diet that consists mainly of plant-based foods, such as fruits, vegetables, nuts, legumes, and whole grains.  · Using low-heat cooking methods, such as baking, instead of high-heat cooking methods, such as deep frying.  Work with your dietitian to make sure you understand how to use the Nutrition Facts information on food labels.  How can food affect me?  Carbohydrates   Carbohydrates affect your blood glucose level more than any other type of food. Your dietitian will help you determine how many carbohydrates to eat at each meal and teach you how to count carbohydrates. Counting carbohydrates is important to keep your blood glucose at a healthy level, especially if you are using insulin or taking certain medicines for diabetes mellitus.  Alcohol   Alcohol can cause sudden decreases in blood glucose (hypoglycemia), especially if you use insulin or take certain medicines for diabetes mellitus. Hypoglycemia can be a life-threatening condition. Symptoms of hypoglycemia (sleepiness, dizziness, and disorientation) are similar to symptoms of having too much alcohol.  If your health care provider has given you approval to drink alcohol, do so in moderation and use the following guidelines:  · Women should not have more than one drink per day, and men should not have more than two drinks per day. One drink is equal to:  ¨ 12 oz of beer.  ¨ 5 oz of wine.  ¨ 1½ oz of hard liquor.  · Do not drink on an empty stomach.  · Keep yourself hydrated. Have water, diet soda, or unsweetened iced tea.  · Regular soda, juice, and other mixers might contain a lot of carbohydrates and should be counted.  What foods are not  recommended?  As you make food choices, it is important to remember that all foods are not the same. Some foods have fewer nutrients per serving than other foods, even though they might have the same number of calories or carbohydrates. It is difficult to get your body what it needs when you eat foods with fewer nutrients. Examples of foods that you should avoid that are high in calories and carbohydrates but low in nutrients include:  · Trans fats (most processed foods list trans fats on the Nutrition Facts label).  · Regular soda.  · Juice.  · Candy.  · Sweets, such as cake, pie, doughnuts, and cookies.  · Fried foods.  What foods can I eat?  Eat nutrient-rich foods, which will nourish your body and keep you healthy. The food you should eat also will depend on several factors, including:  · The calories you need.  · The medicines you take.  · Your weight.  · Your blood glucose level.  · Your blood pressure level.  · Your cholesterol level.  You should eat a variety of foods, including:  · Protein.  ¨ Lean cuts of meat.  ¨ Proteins low in saturated fats, such as fish, egg whites, and beans. Avoid processed meats.  · Fruits and vegetables.  ¨ Fruits and vegetables that may help control blood glucose levels, such as apples, mangoes, and yams.  · Dairy products.  ¨ Choose fat-free or low-fat dairy products, such as milk, yogurt, and cheese.  · Grains, bread, pasta, and rice.  ¨ Choose whole grain products, such as multigrain bread, whole oats, and brown rice. These foods may help control blood pressure.  · Fats.  ¨ Foods containing healthful fats, such as nuts, avocado, olive oil, canola oil, and fish.  Does everyone with diabetes mellitus have the same meal plan?  Because every person with diabetes mellitus is different, there is not one meal plan that works for everyone. It is very important that you meet with a dietitian who will help you create a meal plan that is just right for you.  This information is not  intended to replace advice given to you by your health care provider. Make sure you discuss any questions you have with your health care provider.  Document Released: 09/14/2006 Document Revised: 05/25/2017 Document Reviewed: 11/14/2014  [employer] Interactive Patient Education © 2017 [employer]      Depression / Suicide Risk    As you are discharged from this [facility] facility, it is important to learn how to keep safe from harming yourself.    Recognize the warning signs:  · Abrupt changes in personality, positive or negative- including increase in energy   · Giving away possessions  · Change in eating patterns- significant weight changes-  positive or negative  · Change in sleeping patterns- unable to sleep or sleeping all the time   · Unwillingness or inability to communicate  · Depression  · Unusual sadness, discouragement and loneliness  · Talk of wanting to die  · Neglect of personal appearance   · Rebelliousness- reckless behavior  · Withdrawal from people/activities they love  · Confusion- inability to concentrate     If you or a loved one observes any of these behaviors or has concerns about self-harm, here's what you can do:  · Talk about it- your feelings and reasons for harming yourself  · Remove any means that you might use to hurt yourself (examples: pills, rope, extension cords, firearm)  · Get professional help from the community (Mental Health, Substance Abuse, psychological counseling)  · Do not be alone:Call your Safe Contact- someone whom you trust who will be there for you.  · Call your local CRISIS HOTLINE 568-3487 or 081-236-5471  · Call your local Children's Mobile Crisis Response Team [place] (984) 743-7493 or www.AllofMe  · Call the toll free National Suicide Prevention Hotlines   · National Suicide Prevention Lifeline 138-834-HWBY (7454)  · National Hope Line Network 800-SUICIDE (850-2900)

## 2018-11-12 NOTE — CARE PLAN
Problem: Communication  Goal: The ability to communicate needs accurately and effectively will improve  Outcome: NOT MET  Patient withdrawn. Encouraged to voice feelings. Patient had minimal educational questions was receptive to education and attentive regarding diabetes.    Problem: Safety  Goal: Will remain free from falls  Outcome: PROGRESSING AS EXPECTED  Patient calls approprietly. Needs assistance managing IV pole

## 2018-11-12 NOTE — DIETARY
Nutrition Services:  Met with patient to provide meal planning education.  Left handouts in English and Maldivian.  See education section.

## 2018-11-12 NOTE — CARE PLAN
Problem: Knowledge Deficit  Goal: Knowledge of disease process/condition, treatment plan, diagnostic tests, and medications will improve  Outcome: PROGRESSING AS EXPECTED  Extensive education given on diabetes, handouts from [facility] printed in english and Greenlandic per pt preference. Pt feeling overwhelmed, RN listened and advised pt.     Problem: Discharge Barriers/Planning  Goal: Patient's continuum of care needs will be met  Outcome: PROGRESSING AS EXPECTED  Pt given oral antidiabetic medications, education regarding managing diabetes type 2. Currently w diabetes educator, pending d/c.

## 2018-11-13 NOTE — CONSULTS
Diabetes education: Met with patient this am before discharge. Pt is newly dx with diabetes to go home on oral agents.  Discussed what diabetes was, the difference between type one and type two, hypoglycemia, hyperglycemia,  sick day,  goals for blood sugars  . Discussed need for carbohydrates and proteins, with every meal and snack as well as why. Discussed the importance of the HS snack with a carbohydrate and protein. Discussed need, benefit and precautions with exercise.  Discussed  what effects blood sugars.   Pt was given a One touch verio flex meter and taught to use. Discussed need to set up PCP and follow up.   Briefly discussed long term complications, oral medications and possible need for insulin. Pt practice given insulin with nursing. Handouts given.

## 2018-11-13 NOTE — DISCHARGE SUMMARY
Discharge Summary    CHIEF COMPLAINT ON ADMISSION  Chief Complaint   Patient presents with   • Back Pain   • Leg Pain   • Rectal Pain       Reason for Admission  Back Pain     Admission Date  11/11/2018    CODE STATUS  full    HPI & HOSPITAL COURSE  33 y.o. male with history of obesity who presented 11/11/2018 with complaints of back pain, radiating to his bilateral legs with some bilateral leg numbness.  Up to 8 out of 10, worsening with movement.  He denies urine incontinence or fecal incontinence.  He does complain of some rectal pain that is not new.  It is associated with painful defecation and small amount of blood on toilet paper.  He was evaluated for this recently at [facility] and diagnosed with hemorrhoids and was treated with cream and stool softeners.  Review of system positive for source, poorly urea, weight loss 30 pounds unintentional in the last few months.  Blood work characteristic for a markedly high blood sugar at 600.  Therefore, patient was diagnosed with new onset diabetes today.        He was hydrated . He was started on lantus and ssi. Patient felt that he would be more compliant with pills. He was switched over to po metformin and po glyburide. He received diabetic education and a true metrix glucometer    Therefore, he is discharged in good and stable condition to home with close outpatient follow-up.    The patient recovered much more quickly than anticipated on admission.    Discharge Date  11/12/2018    FOLLOW UP ITEMS POST DISCHARGE      DISCHARGE DIAGNOSES  Active Problems:    Diabetes mellitus with hyperglycemia (HCC) POA: Unknown    Low back pain POA: Unknown    Hemorrhoid POA: Unknown    Hyponatremia POA: Unknown    Class 2 obesity in adult POA: Unknown    Smoking POA: Unknown  Resolved Problems:    Dehydration POA: Unknown  hypokalemia    FOLLOW UP  No future appointments.  [facility]   [address] [facility] [address]  [place] [address]  [phone]  Schedule an  appointment as soon as possible for a visit in 1 week  Due to your insurance please call [facility] to establish with a new primary care provider.     Pcp Pt States None            MEDICATIONS ON DISCHARGE     Medication List      START taking these medications      Instructions   Alcohol Swabs   Doctor's comments:  Per formulary preference. ICD-10 code: E11.65 Uncontrolled type 2 Diabetes Mellitus  Wipe site with prep pad prior to injection.     Blood Glucose Test Strips   Doctor's comments:  Or per formulary preference. ICD-10 code: E11.65 Uncontrolled type 2 Diabetes Mellitus  Use one strip( one touch verio flex)  to test blood sugar twice daily before meals.     glyBURIDE 5 MG Tabs  Commonly known as:  DIABETA   Take 1 Tab by mouth 2 times a day, with meals.  Dose:  5 mg     Lancets   Doctor's comments:  Or per formulary preference. ICD-10 code: E11.65 Uncontrolled type 2 Diabetes Mellitus  Use one True Metrix lancet to test blood sugar twice daily before meals.     metformin 1000 MG tablet  Commonly known as:  GLUCOPHAGE   Take 1 Tab by mouth 2 times a day, with meals.  Dose:  1000 mg            Allergies  No Known Allergies    DIET  No orders of the defined types were placed in this encounter.      ACTIVITY  As tolerated.  Weight bearing as tolerated    CONSULTATIONS  diabetic educator    PROCEDURES    LABORATORY  Lab Results   Component Value Date    SODIUM 133 (L) 11/12/2018    POTASSIUM 3.3 (L) 11/12/2018    CHLORIDE 104 11/12/2018    CO2 24 11/12/2018    GLUCOSE 275 (H) 11/12/2018    BUN 5 (L) 11/12/2018    CREATININE 0.60 11/12/2018    CREATININE 1.1 09/28/2006        Lab Results   Component Value Date    WBC 7.8 11/12/2018    HEMOGLOBIN 12.0 (L) 11/12/2018    HEMATOCRIT 35.8 (L) 11/12/2018    PLATELETCT 233 11/12/2018        Total time of the discharge process exceeds  38minutes.

## 2019-05-01 ENCOUNTER — NON-PROVIDER VISIT (OUTPATIENT)
Dept: URGENT CARE | Facility: CLINIC | Age: 34
End: 2019-05-01

## 2019-05-01 DIAGNOSIS — Z02.1 PRE-EMPLOYMENT DRUG SCREENING: ICD-10-CM

## 2019-05-01 LAB
AMP AMPHETAMINE: NORMAL
BREATH ALCOHOL COMMENT: NORMAL
COC COCAINE: NORMAL
INT CON NEG: NORMAL
INT CON POS: NORMAL
MET METHAMPHETAMINES: NORMAL
OPI OPIATES: NORMAL
PCP PHENCYCLIDINE: NORMAL
POC BREATHALIZER: 0 PERCENT (ref 0–0.01)
POC DRUG COMMENT 753798-OCCUPATIONAL HEALTH: NEGATIVE
THC: NORMAL

## 2019-05-01 PROCEDURE — 82075 ASSAY OF BREATH ETHANOL: CPT | Performed by: FAMILY MEDICINE

## 2019-05-01 PROCEDURE — 80305 DRUG TEST PRSMV DIR OPT OBS: CPT | Performed by: FAMILY MEDICINE

## 2019-06-06 ENCOUNTER — NON-PROVIDER VISIT (OUTPATIENT)
Dept: URGENT CARE | Facility: CLINIC | Age: 34
End: 2019-06-06

## 2019-06-06 DIAGNOSIS — Z02.1 PRE-EMPLOYMENT DRUG SCREENING: ICD-10-CM

## 2019-06-06 LAB
AMP AMPHETAMINE: NORMAL
BREATH ALCOHOL COMMENT: NORMAL
COC COCAINE: NORMAL
INT CON NEG: NORMAL
INT CON POS: NORMAL
MET METHAMPHETAMINES: NORMAL
OPI OPIATES: NORMAL
PCP PHENCYCLIDINE: NORMAL
POC BREATHALIZER: 0 PERCENT (ref 0–0.01)
POC DRUG COMMENT 753798-OCCUPATIONAL HEALTH: NORMAL
THC: NORMAL

## 2019-06-06 PROCEDURE — 80305 DRUG TEST PRSMV DIR OPT OBS: CPT | Performed by: FAMILY MEDICINE

## 2019-06-06 PROCEDURE — 82075 ASSAY OF BREATH ETHANOL: CPT | Performed by: FAMILY MEDICINE

## 2019-07-21 ENCOUNTER — HOSPITAL ENCOUNTER (EMERGENCY)
Facility: MEDICAL CENTER | Age: 34
End: 2019-07-21
Attending: EMERGENCY MEDICINE

## 2019-07-21 VITALS
TEMPERATURE: 98.2 F | SYSTOLIC BLOOD PRESSURE: 106 MMHG | OXYGEN SATURATION: 97 % | RESPIRATION RATE: 18 BRPM | HEIGHT: 66 IN | BODY MASS INDEX: 37.12 KG/M2 | DIASTOLIC BLOOD PRESSURE: 74 MMHG | HEART RATE: 91 BPM | WEIGHT: 231 LBS

## 2019-07-21 DIAGNOSIS — Z00.8 MEDICAL CLEARANCE FOR INCARCERATION: ICD-10-CM
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
## 2019-07-21 PROCEDURE — 99284 EMERGENCY DEPT VISIT MOD MDM: CPT
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
## 2020-04-19 ENCOUNTER — HOSPITAL ENCOUNTER (EMERGENCY)
Dept: HOSPITAL 8 - ED | Age: 35
Discharge: HOME | End: 2020-04-19
Payer: COMMERCIAL

## 2020-04-19 VITALS — HEIGHT: 65 IN | BODY MASS INDEX: 39.3 KG/M2 | WEIGHT: 235.89 LBS

## 2020-04-19 VITALS — SYSTOLIC BLOOD PRESSURE: 116 MMHG | DIASTOLIC BLOOD PRESSURE: 72 MMHG
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
## 2020-04-19 DIAGNOSIS — S30.0XXA: Primary | ICD-10-CM
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
## 2020-04-19 LAB
CULTURE INDICATED?: NO
MICROSCOPIC: (no result)

## 2020-04-19 PROCEDURE — 72110 X-RAY EXAM L-2 SPINE 4/>VWS: CPT

## 2020-04-19 PROCEDURE — 81003 URINALYSIS AUTO W/O SCOPE: CPT

## 2020-04-19 PROCEDURE — 99284 EMERGENCY DEPT VISIT MOD MDM: CPT

## 2020-04-19 NOTE — NUR
C/O LOWER BACK PAIN, MIDLINE; STARTED ABOUT 2 WEEKS.  DENIES NUMBNESS/TINGLING 
TO LE'S BILAT.  PT WAS PUTTING A BOX ON A SHELF.  TYLENOL AT 0200.

## 2020-05-06 ENCOUNTER — HOSPITAL ENCOUNTER (EMERGENCY)
Dept: HOSPITAL 8 - ED | Age: 35
Discharge: HOME | End: 2020-05-06
Payer: COMMERCIAL

## 2020-05-06 VITALS — WEIGHT: 232.37 LBS | HEIGHT: 66 IN | BODY MASS INDEX: 37.34 KG/M2

## 2020-05-06 VITALS — SYSTOLIC BLOOD PRESSURE: 136 MMHG | DIASTOLIC BLOOD PRESSURE: 66 MMHG

## 2020-05-06 DIAGNOSIS — G89.11: Primary | ICD-10-CM

## 2020-05-06 DIAGNOSIS — M54.5: ICD-10-CM

## 2020-05-06 PROCEDURE — 99283 EMERGENCY DEPT VISIT LOW MDM: CPT
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
## 2020-06-01 ENCOUNTER — HOSPITAL ENCOUNTER (INPATIENT)
Dept: HOSPITAL 8 - ED | Age: 35
LOS: 4 days | Discharge: HOME | DRG: 539 | End: 2020-06-05
Attending: INTERNAL MEDICINE
Payer: COMMERCIAL

## 2020-06-01 VITALS — WEIGHT: 223.33 LBS | BODY MASS INDEX: 35.89 KG/M2 | HEIGHT: 66 IN

## 2020-06-01 VITALS — DIASTOLIC BLOOD PRESSURE: 73 MMHG | SYSTOLIC BLOOD PRESSURE: 118 MMHG

## 2020-06-01 DIAGNOSIS — M46.46: ICD-10-CM

## 2020-06-01 DIAGNOSIS — M48.061: ICD-10-CM

## 2020-06-01 DIAGNOSIS — G06.2: ICD-10-CM

## 2020-06-01 DIAGNOSIS — M46.26: Primary | ICD-10-CM

## 2020-06-01 DIAGNOSIS — D64.9: ICD-10-CM

## 2020-06-01 LAB
ALBUMIN SERPL-MCNC: 3.4 G/DL (ref 3.4–5)
ALP SERPL-CCNC: 68 U/L (ref 45–117)
ALT SERPL-CCNC: 18 U/L (ref 12–78)
ANION GAP SERPL CALC-SCNC: 7 MMOL/L (ref 5–15)
BASOPHILS # BLD AUTO: 0.02 X10^3/UL (ref 0–0.1)
BASOPHILS NFR BLD AUTO: 0 % (ref 0–1)
BILIRUB SERPL-MCNC: 0.9 MG/DL (ref 0.2–1)
CALCIUM SERPL-MCNC: 9 MG/DL (ref 8.5–10.1)
CHLORIDE SERPL-SCNC: 106 MMOL/L (ref 98–107)
CREAT SERPL-MCNC: 0.73 MG/DL (ref 0.7–1.3)
CRP SERPL-MCNC: 1.9 MG/DL (ref 0.02–0.49)
EOSINOPHIL # BLD AUTO: 0.17 X10^3/UL (ref 0–0.4)
EOSINOPHIL NFR BLD AUTO: 2 % (ref 1–7)
ERYTHROCYTE [DISTWIDTH] IN BLOOD BY AUTOMATED COUNT: 13.3 % (ref 9.4–14.8)
ERYTHROCYTE [SEDIMENTATION RATE] IN BLOOD BY WESTERGREN METHOD: 43 MM/HR (ref 0–10)
HCT (SEDRATE): 41.2 % (ref 39.2–51.8)
LYMPHOCYTES # BLD AUTO: 1.67 X10^3/UL (ref 1–3.4)
LYMPHOCYTES NFR BLD AUTO: 20 % (ref 22–44)
MCH RBC QN AUTO: 28.8 PG (ref 27.5–34.5)
MCHC RBC AUTO-ENTMCNC: 32.7 G/DL (ref 33.2–36.2)
MCV RBC AUTO: 88.3 FL (ref 81–97)
MD: NO
MICROSCOPIC: (no result)
MONOCYTES # BLD AUTO: 0.42 X10^3/UL (ref 0.2–0.8)
MONOCYTES NFR BLD AUTO: 5 % (ref 2–9)
NEUTROPHILS # BLD AUTO: 5.99 X10^3/UL (ref 1.8–6.8)
NEUTROPHILS NFR BLD AUTO: 72 % (ref 42–75)
PLATELET # BLD AUTO: 302 X10^3/UL (ref 130–400)
PMV BLD AUTO: 7.9 FL (ref 7.4–10.4)
PROT SERPL-MCNC: 7.7 G/DL (ref 6.4–8.2)
RBC # BLD AUTO: 4.75 X10^6/UL (ref 4.38–5.82)

## 2020-06-01 PROCEDURE — 72149 MRI LUMBAR SPINE W/DYE: CPT

## 2020-06-01 PROCEDURE — 99285 EMERGENCY DEPT VISIT HI MDM: CPT

## 2020-06-01 PROCEDURE — 81003 URINALYSIS AUTO W/O SCOPE: CPT

## 2020-06-01 PROCEDURE — 85651 RBC SED RATE NONAUTOMATED: CPT

## 2020-06-01 PROCEDURE — 87102 FUNGUS ISOLATION CULTURE: CPT

## 2020-06-01 PROCEDURE — 93306 TTE W/DOPPLER COMPLETE: CPT

## 2020-06-01 PROCEDURE — 93356 MYOCRD STRAIN IMG SPCKL TRCK: CPT

## 2020-06-01 PROCEDURE — 87205 SMEAR GRAM STAIN: CPT

## 2020-06-01 PROCEDURE — 99156 MOD SED OTH PHYS/QHP 5/>YRS: CPT

## 2020-06-01 PROCEDURE — 84100 ASSAY OF PHOSPHORUS: CPT

## 2020-06-01 PROCEDURE — 36573 INSJ PICC RS&I 5 YR+: CPT

## 2020-06-01 PROCEDURE — A9575 INJ GADOTERATE MEGLUMI 0.1ML: HCPCS

## 2020-06-01 PROCEDURE — C1751 CATH, INF, PER/CENT/MIDLINE: HCPCS

## 2020-06-01 PROCEDURE — 83735 ASSAY OF MAGNESIUM: CPT

## 2020-06-01 PROCEDURE — 87075 CULTR BACTERIA EXCEPT BLOOD: CPT

## 2020-06-01 PROCEDURE — 72148 MRI LUMBAR SPINE W/O DYE: CPT

## 2020-06-01 PROCEDURE — 36415 COLL VENOUS BLD VENIPUNCTURE: CPT

## 2020-06-01 PROCEDURE — 87040 BLOOD CULTURE FOR BACTERIA: CPT

## 2020-06-01 PROCEDURE — 87070 CULTURE OTHR SPECIMN AEROBIC: CPT

## 2020-06-01 PROCEDURE — 80202 ASSAY OF VANCOMYCIN: CPT

## 2020-06-01 PROCEDURE — 85025 COMPLETE CBC W/AUTO DIFF WBC: CPT

## 2020-06-01 PROCEDURE — 83605 ASSAY OF LACTIC ACID: CPT

## 2020-06-01 PROCEDURE — 86140 C-REACTIVE PROTEIN: CPT

## 2020-06-01 PROCEDURE — 71045 X-RAY EXAM CHEST 1 VIEW: CPT

## 2020-06-01 PROCEDURE — 62267 INTERDISCAL PERQ ASPIR DX: CPT

## 2020-06-01 PROCEDURE — 80048 BASIC METABOLIC PNL TOTAL CA: CPT

## 2020-06-01 PROCEDURE — 80307 DRUG TEST PRSMV CHEM ANLYZR: CPT

## 2020-06-01 PROCEDURE — 80053 COMPREHEN METABOLIC PANEL: CPT

## 2020-06-01 PROCEDURE — 99157 MOD SED OTHER PHYS/QHP EA: CPT

## 2020-06-01 PROCEDURE — 84443 ASSAY THYROID STIM HORMONE: CPT

## 2020-06-01 PROCEDURE — 77012 CT SCAN FOR NEEDLE BIOPSY: CPT

## 2020-06-01 RX ADMIN — SODIUM CHLORIDE, SODIUM LACTATE, POTASSIUM CHLORIDE, AND CALCIUM CHLORIDE SCH MLS/HR: .6; .31; .03; .02 INJECTION, SOLUTION INTRAVENOUS at 22:07

## 2020-06-01 RX ADMIN — MORPHINE SULFATE PRN MG: 4 INJECTION INTRAVENOUS at 12:33

## 2020-06-01 RX ADMIN — FAMOTIDINE SCH MG: 20 TABLET, FILM COATED ORAL at 21:00

## 2020-06-01 RX ADMIN — FAMOTIDINE SCH MG: 20 TABLET, FILM COATED ORAL at 20:57

## 2020-06-01 RX ADMIN — MORPHINE SULFATE PRN MG: 4 INJECTION INTRAVENOUS at 15:52

## 2020-06-02 VITALS — DIASTOLIC BLOOD PRESSURE: 60 MMHG | SYSTOLIC BLOOD PRESSURE: 97 MMHG

## 2020-06-02 VITALS — SYSTOLIC BLOOD PRESSURE: 105 MMHG | DIASTOLIC BLOOD PRESSURE: 55 MMHG

## 2020-06-02 VITALS — DIASTOLIC BLOOD PRESSURE: 66 MMHG | SYSTOLIC BLOOD PRESSURE: 104 MMHG

## 2020-06-02 VITALS — SYSTOLIC BLOOD PRESSURE: 108 MMHG | DIASTOLIC BLOOD PRESSURE: 69 MMHG

## 2020-06-02 VITALS — SYSTOLIC BLOOD PRESSURE: 103 MMHG | DIASTOLIC BLOOD PRESSURE: 56 MMHG

## 2020-06-02 VITALS — SYSTOLIC BLOOD PRESSURE: 107 MMHG | DIASTOLIC BLOOD PRESSURE: 71 MMHG

## 2020-06-02 VITALS — DIASTOLIC BLOOD PRESSURE: 57 MMHG | SYSTOLIC BLOOD PRESSURE: 101 MMHG

## 2020-06-02 VITALS — DIASTOLIC BLOOD PRESSURE: 56 MMHG | SYSTOLIC BLOOD PRESSURE: 105 MMHG

## 2020-06-02 LAB
ALBUMIN SERPL-MCNC: 3 G/DL (ref 3.4–5)
ALP SERPL-CCNC: 61 U/L (ref 45–117)
ALT SERPL-CCNC: 17 U/L (ref 12–78)
ANION GAP SERPL CALC-SCNC: 6 MMOL/L (ref 5–15)
BASOPHILS # BLD AUTO: 0.05 X10^3/UL (ref 0–0.1)
BASOPHILS NFR BLD AUTO: 1 % (ref 0–1)
BILIRUB SERPL-MCNC: 0.7 MG/DL (ref 0.2–1)
CALCIUM SERPL-MCNC: 8.7 MG/DL (ref 8.5–10.1)
CHLORIDE SERPL-SCNC: 106 MMOL/L (ref 98–107)
CREAT SERPL-MCNC: 0.74 MG/DL (ref 0.7–1.3)
EOSINOPHIL # BLD AUTO: 0.28 X10^3/UL (ref 0–0.4)
EOSINOPHIL NFR BLD AUTO: 3 % (ref 1–7)
ERYTHROCYTE [DISTWIDTH] IN BLOOD BY AUTOMATED COUNT: 13.5 % (ref 9.4–14.8)
LYMPHOCYTES # BLD AUTO: 1.94 X10^3/UL (ref 1–3.4)
LYMPHOCYTES NFR BLD AUTO: 20 % (ref 22–44)
MCH RBC QN AUTO: 29.2 PG (ref 27.5–34.5)
MCHC RBC AUTO-ENTMCNC: 33.3 G/DL (ref 33.2–36.2)
MCV RBC AUTO: 87.7 FL (ref 81–97)
MD: NO
MONOCYTES # BLD AUTO: 0.55 X10^3/UL (ref 0.2–0.8)
MONOCYTES NFR BLD AUTO: 6 % (ref 2–9)
NEUTROPHILS # BLD AUTO: 6.99 X10^3/UL (ref 1.8–6.8)
NEUTROPHILS NFR BLD AUTO: 71 % (ref 42–75)
PLATELET # BLD AUTO: 307 X10^3/UL (ref 130–400)
PMV BLD AUTO: 7.8 FL (ref 7.4–10.4)
PROT SERPL-MCNC: 7 G/DL (ref 6.4–8.2)
RBC # BLD AUTO: 4.32 X10^6/UL (ref 4.38–5.82)

## 2020-06-02 RX ADMIN — METHOCARBAMOL PRN MG: 500 TABLET ORAL at 04:44

## 2020-06-02 RX ADMIN — CEFTRIAXONE SCH MLS/HR: 2 INJECTION, SOLUTION INTRAVENOUS at 20:05

## 2020-06-02 RX ADMIN — ACETAMINOPHEN PRN MG: 325 TABLET, FILM COATED ORAL at 19:20

## 2020-06-02 RX ADMIN — VANCOMYCIN HYDROCHLORIDE SCH MLS/HR: 1 INJECTION, POWDER, LYOPHILIZED, FOR SOLUTION INTRAVENOUS at 15:10

## 2020-06-02 RX ADMIN — FAMOTIDINE SCH MG: 20 TABLET, FILM COATED ORAL at 20:05

## 2020-06-02 RX ADMIN — FAMOTIDINE SCH MG: 20 TABLET, FILM COATED ORAL at 09:05

## 2020-06-02 RX ADMIN — VANCOMYCIN HYDROCHLORIDE SCH MLS/HR: 1 INJECTION, POWDER, LYOPHILIZED, FOR SOLUTION INTRAVENOUS at 03:04

## 2020-06-02 RX ADMIN — SODIUM CHLORIDE, SODIUM LACTATE, POTASSIUM CHLORIDE, AND CALCIUM CHLORIDE SCH MLS/HR: .6; .31; .03; .02 INJECTION, SOLUTION INTRAVENOUS at 15:12

## 2020-06-02 RX ADMIN — ACETAMINOPHEN PRN MG: 325 TABLET, FILM COATED ORAL at 01:38

## 2020-06-02 RX ADMIN — OXYCODONE HYDROCHLORIDE PRN MG: 5 TABLET ORAL at 19:20

## 2020-06-02 RX ADMIN — OXYCODONE HYDROCHLORIDE PRN MG: 5 TABLET ORAL at 01:38

## 2020-06-02 RX ADMIN — OXYCODONE HYDROCHLORIDE PRN MG: 5 TABLET ORAL at 07:02

## 2020-06-02 RX ADMIN — DOCUSATE SODIUM 50MG AND SENNOSIDES 8.6MG SCH TAB: 8.6; 5 TABLET, FILM COATED ORAL at 09:05

## 2020-06-03 VITALS — DIASTOLIC BLOOD PRESSURE: 74 MMHG | SYSTOLIC BLOOD PRESSURE: 124 MMHG

## 2020-06-03 VITALS — SYSTOLIC BLOOD PRESSURE: 110 MMHG | DIASTOLIC BLOOD PRESSURE: 71 MMHG

## 2020-06-03 VITALS — SYSTOLIC BLOOD PRESSURE: 103 MMHG | DIASTOLIC BLOOD PRESSURE: 67 MMHG

## 2020-06-03 VITALS — DIASTOLIC BLOOD PRESSURE: 68 MMHG | SYSTOLIC BLOOD PRESSURE: 116 MMHG

## 2020-06-03 RX ADMIN — METHOCARBAMOL PRN MG: 500 TABLET ORAL at 10:51

## 2020-06-03 RX ADMIN — CEFTRIAXONE SCH MLS/HR: 2 INJECTION, SOLUTION INTRAVENOUS at 20:06

## 2020-06-03 RX ADMIN — OXYCODONE HYDROCHLORIDE PRN MG: 5 TABLET ORAL at 22:15

## 2020-06-03 RX ADMIN — OXYCODONE HYDROCHLORIDE PRN MG: 5 TABLET ORAL at 06:46

## 2020-06-03 RX ADMIN — FAMOTIDINE SCH MG: 20 TABLET, FILM COATED ORAL at 09:05

## 2020-06-03 RX ADMIN — ACETAMINOPHEN PRN MG: 325 TABLET, FILM COATED ORAL at 06:45

## 2020-06-03 RX ADMIN — OXYCODONE HYDROCHLORIDE PRN MG: 5 TABLET ORAL at 10:51

## 2020-06-03 RX ADMIN — ACETAMINOPHEN PRN MG: 325 TABLET, FILM COATED ORAL at 00:32

## 2020-06-03 RX ADMIN — DOCUSATE SODIUM 50MG AND SENNOSIDES 8.6MG SCH TAB: 8.6; 5 TABLET, FILM COATED ORAL at 09:05

## 2020-06-03 RX ADMIN — FAMOTIDINE SCH MG: 20 TABLET, FILM COATED ORAL at 20:06

## 2020-06-03 RX ADMIN — OXYCODONE HYDROCHLORIDE PRN MG: 5 TABLET ORAL at 00:32

## 2020-06-03 RX ADMIN — VANCOMYCIN HYDROCHLORIDE SCH MLS/HR: 1 INJECTION, POWDER, LYOPHILIZED, FOR SOLUTION INTRAVENOUS at 15:26

## 2020-06-03 RX ADMIN — VANCOMYCIN HYDROCHLORIDE SCH MLS/HR: 1 INJECTION, POWDER, LYOPHILIZED, FOR SOLUTION INTRAVENOUS at 02:55

## 2020-06-03 RX ADMIN — METHOCARBAMOL PRN MG: 500 TABLET ORAL at 20:14

## 2020-06-03 RX ADMIN — OXYCODONE HYDROCHLORIDE PRN MG: 5 TABLET ORAL at 18:07

## 2020-06-04 VITALS — SYSTOLIC BLOOD PRESSURE: 113 MMHG | DIASTOLIC BLOOD PRESSURE: 76 MMHG

## 2020-06-04 VITALS — SYSTOLIC BLOOD PRESSURE: 108 MMHG | DIASTOLIC BLOOD PRESSURE: 64 MMHG

## 2020-06-04 VITALS — DIASTOLIC BLOOD PRESSURE: 64 MMHG | SYSTOLIC BLOOD PRESSURE: 105 MMHG

## 2020-06-04 VITALS — DIASTOLIC BLOOD PRESSURE: 73 MMHG | SYSTOLIC BLOOD PRESSURE: 115 MMHG

## 2020-06-04 LAB
ANION GAP SERPL CALC-SCNC: 6 MMOL/L (ref 5–15)
BASOPHILS # BLD AUTO: 0.03 X10^3/UL (ref 0–0.1)
BASOPHILS NFR BLD AUTO: 1 % (ref 0–1)
CALCIUM SERPL-MCNC: 8.6 MG/DL (ref 8.5–10.1)
CHLORIDE SERPL-SCNC: 105 MMOL/L (ref 98–107)
CREAT SERPL-MCNC: 0.83 MG/DL (ref 0.7–1.3)
EOSINOPHIL # BLD AUTO: 0.23 X10^3/UL (ref 0–0.4)
EOSINOPHIL NFR BLD AUTO: 3 % (ref 1–7)
ERYTHROCYTE [DISTWIDTH] IN BLOOD BY AUTOMATED COUNT: 13.2 % (ref 9.4–14.8)
LYMPHOCYTES # BLD AUTO: 1.69 X10^3/UL (ref 1–3.4)
LYMPHOCYTES NFR BLD AUTO: 24 % (ref 22–44)
MCH RBC QN AUTO: 28.7 PG (ref 27.5–34.5)
MCHC RBC AUTO-ENTMCNC: 32.9 G/DL (ref 33.2–36.2)
MCV RBC AUTO: 87.1 FL (ref 81–97)
MD: NO
MONOCYTES # BLD AUTO: 0.45 X10^3/UL (ref 0.2–0.8)
MONOCYTES NFR BLD AUTO: 6 % (ref 2–9)
NEUTROPHILS # BLD AUTO: 4.72 X10^3/UL (ref 1.8–6.8)
NEUTROPHILS NFR BLD AUTO: 66 % (ref 42–75)
PLATELET # BLD AUTO: 278 X10^3/UL (ref 130–400)
PMV BLD AUTO: 8 FL (ref 7.4–10.4)
RBC # BLD AUTO: 4.25 X10^6/UL (ref 4.38–5.82)

## 2020-06-04 RX ADMIN — FAMOTIDINE SCH MG: 20 TABLET, FILM COATED ORAL at 08:17

## 2020-06-04 RX ADMIN — MORPHINE SULFATE PRN MG: 10 INJECTION INTRAVENOUS at 12:20

## 2020-06-04 RX ADMIN — TEMAZEPAM PRN MG: 15 CAPSULE ORAL at 00:16

## 2020-06-04 RX ADMIN — METHOCARBAMOL PRN MG: 500 TABLET ORAL at 15:52

## 2020-06-04 RX ADMIN — VANCOMYCIN HYDROCHLORIDE SCH MLS/HR: 1 INJECTION, POWDER, LYOPHILIZED, FOR SOLUTION INTRAVENOUS at 15:49

## 2020-06-04 RX ADMIN — FAMOTIDINE SCH MG: 20 TABLET, FILM COATED ORAL at 20:24

## 2020-06-04 RX ADMIN — CEFTRIAXONE SCH MLS/HR: 2 INJECTION, SOLUTION INTRAVENOUS at 20:25

## 2020-06-04 RX ADMIN — ENOXAPARIN SODIUM SCH MG: 40 INJECTION SUBCUTANEOUS at 12:20

## 2020-06-04 RX ADMIN — VANCOMYCIN HYDROCHLORIDE SCH MLS/HR: 1 INJECTION, POWDER, LYOPHILIZED, FOR SOLUTION INTRAVENOUS at 03:05

## 2020-06-04 RX ADMIN — GABAPENTIN PRN MG: 300 CAPSULE ORAL at 20:25

## 2020-06-04 RX ADMIN — MORPHINE SULFATE PRN MG: 10 INJECTION INTRAVENOUS at 08:17

## 2020-06-04 RX ADMIN — OXYCODONE HYDROCHLORIDE PRN MG: 5 TABLET ORAL at 23:12

## 2020-06-04 RX ADMIN — OXYCODONE HYDROCHLORIDE PRN MG: 5 TABLET ORAL at 03:04

## 2020-06-04 RX ADMIN — MORPHINE SULFATE PRN MG: 10 INJECTION INTRAVENOUS at 20:27

## 2020-06-04 RX ADMIN — MORPHINE SULFATE PRN MG: 10 INJECTION INTRAVENOUS at 04:12

## 2020-06-04 RX ADMIN — GABAPENTIN PRN MG: 300 CAPSULE ORAL at 03:04

## 2020-06-04 RX ADMIN — OXYCODONE HYDROCHLORIDE PRN MG: 5 TABLET ORAL at 15:53

## 2020-06-04 RX ADMIN — DOCUSATE SODIUM 50MG AND SENNOSIDES 8.6MG SCH TAB: 8.6; 5 TABLET, FILM COATED ORAL at 08:28

## 2020-06-05 VITALS — SYSTOLIC BLOOD PRESSURE: 111 MMHG | DIASTOLIC BLOOD PRESSURE: 77 MMHG

## 2020-06-05 VITALS — DIASTOLIC BLOOD PRESSURE: 70 MMHG | SYSTOLIC BLOOD PRESSURE: 112 MMHG

## 2020-06-05 VITALS — DIASTOLIC BLOOD PRESSURE: 72 MMHG | SYSTOLIC BLOOD PRESSURE: 114 MMHG

## 2020-06-05 PROCEDURE — B548ZZA ULTRASONOGRAPHY OF SUPERIOR VENA CAVA, GUIDANCE: ICD-10-PCS | Performed by: RADIOLOGY

## 2020-06-05 PROCEDURE — 02HV33Z INSERTION OF INFUSION DEVICE INTO SUPERIOR VENA CAVA, PERCUTANEOUS APPROACH: ICD-10-PCS | Performed by: RADIOLOGY

## 2020-06-05 RX ADMIN — FAMOTIDINE SCH MG: 20 TABLET, FILM COATED ORAL at 09:33

## 2020-06-05 RX ADMIN — OXYCODONE HYDROCHLORIDE PRN MG: 5 TABLET ORAL at 09:32

## 2020-06-05 RX ADMIN — MORPHINE SULFATE PRN MG: 10 INJECTION INTRAVENOUS at 13:16

## 2020-06-05 RX ADMIN — MORPHINE SULFATE PRN MG: 10 INJECTION INTRAVENOUS at 03:25

## 2020-06-05 RX ADMIN — DOCUSATE SODIUM 50MG AND SENNOSIDES 8.6MG SCH TAB: 8.6; 5 TABLET, FILM COATED ORAL at 09:33

## 2020-06-05 RX ADMIN — OXYCODONE HYDROCHLORIDE PRN MG: 5 TABLET ORAL at 15:20

## 2020-06-05 RX ADMIN — TEMAZEPAM PRN MG: 15 CAPSULE ORAL at 00:42

## 2020-06-05 RX ADMIN — MORPHINE SULFATE PRN MG: 10 INJECTION INTRAVENOUS at 04:51

## 2020-06-05 RX ADMIN — MORPHINE SULFATE PRN MG: 10 INJECTION INTRAVENOUS at 00:42

## 2020-06-05 RX ADMIN — ENOXAPARIN SODIUM SCH MG: 40 INJECTION SUBCUTANEOUS at 11:30

## 2020-06-05 RX ADMIN — METHOCARBAMOL PRN MG: 500 TABLET ORAL at 09:33

## 2020-06-05 RX ADMIN — VANCOMYCIN HYDROCHLORIDE SCH MLS/HR: 1 INJECTION, POWDER, LYOPHILIZED, FOR SOLUTION INTRAVENOUS at 03:17

## 2020-12-05 ENCOUNTER — HOSPITAL ENCOUNTER (OUTPATIENT)
Dept: LAB | Facility: MEDICAL CENTER | Age: 35
End: 2020-12-05
Attending: FAMILY MEDICINE
Payer: COMMERCIAL

## 2020-12-05 ENCOUNTER — APPOINTMENT (OUTPATIENT)
Dept: LAB | Facility: MEDICAL CENTER | Age: 35
End: 2020-12-05
Payer: COMMERCIAL

## 2020-12-05 PROCEDURE — U0003 INFECTIOUS AGENT DETECTION BY NUCLEIC ACID (DNA OR RNA); SEVERE ACUTE RESPIRATORY SYNDROME CORONAVIRUS 2 (SARS-COV-2) (CORONAVIRUS DISEASE [COVID-19]), AMPLIFIED PROBE TECHNIQUE, MAKING USE OF HIGH THROUGHPUT TECHNOLOGIES AS DESCRIBED BY CMS-2020-01-R: HCPCS

## 2020-12-05 PROCEDURE — C9803 HOPD COVID-19 SPEC COLLECT: HCPCS

## 2020-12-06 LAB
COVID ORDER STATUS COVID19: NORMAL
SARS-COV-2 RNA RESP QL NAA+PROBE: NOTDETECTED
SPECIMEN SOURCE: NORMAL

## 2024-03-26 NOTE — PROGRESS NOTES
Assumed care at 0700, report received from NOC RN.  Pt A&O x 4, states pain is 5/10, declines any intervention at this time. Pt requesting to leave hospital and go home, education provided that pt would be leaving AMA if not discharged by MD. Diabetes education is pending discharge, pt updated on plan of care and made aware that diabetes educator will be in today to see pt. Pt given education regarding self-injecting insulin, teach-back demonstration performed adequately. Pt also given FRANSISCA education regarding insulin injection, managing type 2 diabetes and diabetes with smoking tobacco products. Pt anxious about new diagnosis, listening and education provided to pt.  Bed locked, 2 rails up, bed in lowest position. Call light in place, belongings at bedside, no needs at this time and hourly rounding in place.     RN called patients mother who stated that patient has a cough x 5-6 days with vomiting x 1 time this AM.     Pt has been more tired the last few days. She continues to eat and drink.       RN spoke with patient's mother  who stated these are the only symptom(s) present and patient's guardian denies any further symptom(s) such as: SOB, lethargy, cyanosis, wheezing, tachypnea,  retractions, fever, rash, sore throat, ear tugging, eye drainage.     Plan of care per protocol: home care.      RN confirmed with patient's guardian that they agrees with plan of care and are compliant with protocol education, advice and when to seek further/more appropriate medical attention if symptoms worsen and/or fail to improve. Patient advised to return call/seek care in UC/ED with new, worsening or persistent symptoms. No further questions.       Reason for Disposition   Mild-moderate vomiting (probable viral gastritis)    Protocols used: Vomiting Without Diarrhea-P-OH     I will STOP taking the medications listed below when I get home from the hospital:  None